# Patient Record
(demographics unavailable — no encounter records)

---

## 2025-01-21 NOTE — HISTORY OF PRESENT ILLNESS
[FreeTextEntry1] : Kindly referred by Dr. Sahni for IgA nephropathy. PCP: Dr. Ashlyn Upton. Cardiologist: Dr. George Huynh. Works at [employer].   * BP controlled. BP 120s - 130s at home. No SOB with exertion. No CP. No lightheadedness (pre-syncope). Adherent to medications. * 32 mg albuminuria. eGFR 78 (avg. of CKD-EPIcr & CKD-EPIcys).   Previous history (05Sep24):  * BP controlled.  - 120s at home. No SOB with exertion. No CP. No lightheadedness. Compliant with medications. * eGFR 66 (avg. of CKD-EPIcr & CKD-EPIcys). 95 mg albuminuria.   Previous history (19Apr24): * BP controlled.  - 120s at home. No SOB with exertion. No CP. No lightheadedness. Compliant with medications. * Cr .76, eGFR 73 by [facility]. * Previously minimal albuminuria (67 mg).   Previous history (02Jan24): * Doesn't check BP at home.  No lightheadedness. No CP/SOB. Compliant with medications. * Cr .76, eGFR 73 by [facility]. * Previously minimal albuminuria (67 mg).   Previous history (30May23): Cr 0.74. eGFR 83 (EK[facility] eGFRcys and CKD-EPI eGFRcr). BP controlled. No lightheadedness. No CP/SOB. Compliant with medications. * 67 mg albuminuria. BP controlled. 129s at home. No lightheadedness. No CP/SOB. Compliant with medications.   Previous history (06Feb23): * eGFR by [facility] eGFRcys equation is 87. * HTN controlled. No lightheadedness. No CP/SOB. Compliant with medications. * 99 mg albuminuria. * January 8 ate salty foods.  - 170s. She incresaed lisinopril to 40. She then decreased 30 mg daily. BP now. 120s.   Previous history (14Dec22): *  HTN controlled. BP 120s at home. No lightheadedness. No CP/SOB. Compliant with medications. * 145 mg albuminuria. * CKD stable, creatinine 0.7. * Rhinitis for 1 week. No fevers or SOB.   Previous history (15Sep22): * CKD improved, creatinine 0.74 on Apr 22. *  HTN uncontrolled. BP 130s at home. No lightheadedness. No CP/SOB. Compliant with medications. Salt intake increased.  * Occasional knee arthritis. Seeing Dr. Sparks.   Previous history (04May22): * CKD improved, creatinine 0.74 on Apr 22. * HTN controlled. 110 - 120s at home.  No lightheadedness. No CP/SOB. Compliant with medications. * Occasional knee arthritis.   Previous history (23Nov21): * 85 mg albuminuria. * eGFR 84 by [facility]. * Lisinopril increased to 20. No cough.   No muscle aching or weakness on simvastatn. * HTN controlled. BP 110s at home. No lightheadedness. Compliant with medications. * Occasional knee arthritis.   Previous history (3Oct21): * 90 mg albuminuria. * eGFR 70. * HTN uncontrolled. BP 130s at home. No lightheadedness. Compliant with medications. No cough. * No muscle aching or weakness on simvastatn. * Occasional knee arthritis.   Previous history (11May21): * Previously 73 mg albuminuria in Jun20, creatinine 1.2. eGFR 73. * HTN controlled, no lightheadedness.   Previous history (09Jun20): * Her BP increase to 140s-160s in March (during peak Covid-19) and she increased lisinopril to 30 for 1 week. She then decreased lisinopril to 15 mg and her BP is now controlled. * Proteinuria rpeviously 58 mg/g - 523 mg/g.    Previous history (12Dec19): * The previous records were reviewed and summarized. IgA nephropathy diagnosed by biopsy in 6/18 with focal and mild tubular atrophy and focal and mild interstitial fibrosis. Most recent creatinine was 079 in 10/3/19, which has been stable since 2008. She previously had albuminuria as high as 578 mg/g and her most recent albumin was 58 mg/g in 8/18 and 98 in 10/19. Treated with lisionpril and fish oil. No steroids.   HTN borderline controlled with lisinopril 10. Her SBP is routinely 130s at home. Previously her BP was 110s with Dr. Sahni. Temporarily she increased her lisinopril to 20 mg for 2 weeks 6 months ago and then decreased it to 10.

## 2025-01-21 NOTE — ASSESSMENT
[FreeTextEntry1] : # HTN controlled. * Continue lisinopril 20 mg bid.  * The patient's blood pressure was checked with the [employer] [id number]-[id number] using the SPRINT trial protocol after sitting quietly in an empty room with arm supported, back supported, and feet on the floor for 5 minutes. The average of 3 readings were taken. * A counseling information sheet on blood pressure and staying healthy has been given (which they have been instructed to read). * The patient has been counseled to check their BP at home with an automatic arm cuff, write down the readings, and reach me directly on the phone immediately if they are persistently > 180 systolic or if SBP is less than 100 or if lightheadedness develops. They were counseled to bring in all blood pressure readings and medications next visit. * The patient has been counseled that regular office follow-up (at least every 6month for now) is important for monitoring and for their health, and that it is their responsibility to make follow up appointments. * The patient also has been counseled that they must never stop or change any medications without discussing this with me (or another physician).  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 * Recheck labs. * Absolute kidney benefits/risks of SGLT2i in this clinical situation are uncertain. (Lack of albuminuria > 300.) I discussed this with her and she declines currently. * Therapies for kidney disease: blood pressure control; other evidence-based therapies recommended including exercise, a plant-based lower oxalate diet, and 400 mcg folic acid daily * Cardiovascular disease prevention: counseling on healthy diet, physical activity, weight loss, alcohol limitation, blood pressure control; cholesterol therapy; cardiology evaluation/followup advised * A counseling information sheet on CKD has been given (which they have been instructed to read). * The patient has been counseled that chronic kidney disease is a significant condition and regular office follow-up with me (at least every 6 months for now) is important for monitoring and their health, and that it is their responsibility to make a follow-up appointment. * The patient has been counseled never to stop taking their medications without discussing it with me or another doctor. * The patient has been counseled on avoiding NSAIDs. * The patient has been counseled on risk of worsening kidney function and instructed to immediately call and speak with me and go immediately to ER with any severe symptoms, nausea, vomiting, diarrhea, chest pain, or shortness of breath.  # Hypercholesterolemia. * Cont crestor.  # Elevated HGA1c. * Recheck.

## 2025-02-05 NOTE — DATA REVIEWED
[FreeTextEntry1] : Laboratories (May 30, 2023) reviewed and significant for:  Unremarkable complete blood count calcium 9.8 mg/dL (albumin 4.5 g/dL) BUN/creatinine 21/0.74 mg/dL (eGFR 55 U/L) HbA1c 6.2% TSH 2.35 uIU/mL 25-hydroxyvitamin D 35.4 ng/mL  Most recent bone mineral density Date: June 21, 2023 Source: [facility] Site: [facility]  Site	BMD	T-score	Change previous	Change baseline	 Lumbar spine	0.823	-3.0		-3.7%*	 Femoral neck	0.650	-2.8			 Total hip           0.652	-2.8		-2.0% (mean)	 Distal radius	0.550	-3.7		-4.7%	 DXA comments: *Significant change from previous; right hip values Trabecular bone score 1.207 (partially degraded)  Impression: I have personally reviewed the DXA images and report. There is osteoporosis by the World Health Organization criteria. There was a significant decline at the lumbar spine from previous.

## 2025-02-05 NOTE — HISTORY OF PRESENT ILLNESS
[FreeTextEntry1] : Ms. Dariel Bauer is a 65 year-old woman with a history of rheumatoid arthritis and IgA nephropathy presenting for follow-up of osteoporosis. I saw her for an initial visit in December 2023.  Bone History Osteoporosis diagnosed on routine bone density testing around 2018 (no report available); most recent bone density as below Fracture history: None Family history: Sister with history of osteoporosis; no parental history of hip fracture Treatment:  Raloxifene 60 mg daily from 2018 to December 2023 Denosumab 60 mg SC in January 2024, July 2024  Falls: No Height loss: No Kidney stones: No Dental health: Regular appointments, no history of implants, no upcoming procedures planned Exercise: Walks at least 30 minutes most days, Ryan three times a week Dairy intake: 1 serving daily (yogurt daily) Calcium supplements: Does not remember dose Multivitamin: None Vitamin D supplements: Does not remember dose + in calcium supplement  Osteoporosis risk factors include: Postmenopausal status,  race, prior fracture, falls, height loss, small thin bones, tobacco use, excessive alcohol, anorexia, family history, vitamin D deficiency, corticosteroid use, seizure medications, malabsorption, hyperparathyroidism, hyperthyroidism. NEGATIVE EXCEPT: Postmenopausal status, rheumatoid arthritis, family history   Interim History  Metabolic evaluation for secondary causes of bone loss overall unremarkable at her initial visit. She has received denosumab from January 2024 to present.  She has seen multiple providers; notes reviewed. Last laboratory results reviewed. Serum calcium, PTH, and renal function within the normal ranges. 25-hydroxyvitamin D borderline low. She feels well today. No acute issues. Medical and surgical history, medications, allergies, social and family history reviewed and updated as needed.

## 2025-02-05 NOTE — PHYSICAL EXAM
[Alert] : alert [Healthy Appearance] : healthy appearance [No Acute Distress] : no acute distress [Normal Sclera/Conjunctiva] : normal sclera/conjunctiva [Normal Hearing] : hearing was normal [No Respiratory Distress] : no respiratory distress [Kyphosis] : no kyphosis present [No Stigmata of Cushings Syndrome] : no stigmata of Cushings Syndrome [Normal Gait] : normal gait [Acanthosis Nigricans] : no acanthosis nigricans [Normal Insight/Judgement] : insight and judgment were intact [de-identified] : no moon facies, no supraclavicular fat pads

## 2025-02-05 NOTE — ASSESSMENT
[FreeTextEntry1] : Osteoporosis. She has no history of fragility fracture. She has a history of raloxifene therapy and has received denosumab from January 2024 to present. Metabolic evaluation for secondary causes of bone loss previously unremarkable. We have discussed the potential benefits and risks of antiresorptive osteoporosis therapy, including but not limited to osteonecrosis of the jaw and atypical femoral fracture. She is tolerating denosumab and we will continue. We have discussed that denosumab must be dosed every 6 months due to rebound increase in bone breakdown with abrupt discontinuation of therapy, with transition to bisphosphonate therapy prior to a "drug holiday." Continue denosumab 60 mg SC every 6 months; next dose scheduled today at the outpatient infusion center Calcium 6013-7516 mg daily from diet and supplements (to be taken in divided doses as no more than 500-600 mg can be absorbed at one time) Advised additional vitamin D supplementation of 1000 intl units daily Diet, exercise and fall prevention discussed  Elevated hemoglobin A1c. We have reviewed lifestyle modifications for glycemic control.   I reviewed the DXA performed on June 21, 2023 with the patient today. I reviewed the laboratories performed from September 5, 2024 to present with the patient today.  I counseled the patient regarding calcium and vitamin D intake today. I discussed the following osteoporosis therapies: Denosumab  CC: Dr. Ashlyn Upton, Fax [phone]